# Patient Record
Sex: MALE | Race: OTHER | NOT HISPANIC OR LATINO | URBAN - METROPOLITAN AREA
[De-identification: names, ages, dates, MRNs, and addresses within clinical notes are randomized per-mention and may not be internally consistent; named-entity substitution may affect disease eponyms.]

---

## 2017-07-27 ENCOUNTER — EMERGENCY (EMERGENCY)
Facility: HOSPITAL | Age: 37
LOS: 1 days | Discharge: HOME | End: 2017-07-27
Attending: EMERGENCY MEDICINE | Admitting: EMERGENCY MEDICINE
Payer: SELF-PAY

## 2017-07-27 VITALS
TEMPERATURE: 98 F | DIASTOLIC BLOOD PRESSURE: 93 MMHG | OXYGEN SATURATION: 98 % | SYSTOLIC BLOOD PRESSURE: 131 MMHG | HEART RATE: 89 BPM | RESPIRATION RATE: 16 BRPM
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
## 2017-07-27 DIAGNOSIS — S83.209A UNSPECIFIED TEAR OF UNSPECIFIED MENISCUS, CURRENT INJURY, UNSPECIFIED KNEE, INITIAL ENCOUNTER: Chronic | ICD-10-CM
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
## 2017-07-27 DIAGNOSIS — S89.91XA UNSPECIFIED INJURY OF RIGHT LOWER LEG, INITIAL ENCOUNTER: ICD-10-CM

## 2017-07-27 PROCEDURE — 73562 X-RAY EXAM OF KNEE 3: CPT | Mod: 26,RT

## 2017-07-27 PROCEDURE — 99283 EMERGENCY DEPT VISIT LOW MDM: CPT | Mod: 25

## 2017-07-27 PROCEDURE — 73562 X-RAY EXAM OF KNEE 3: CPT

## 2017-07-27 PROCEDURE — 99053 MED SERV 10PM-8AM 24 HR FAC: CPT

## 2017-07-27 RX ORDER — ACETAMINOPHEN 500 MG
1000 TABLET ORAL ONCE
Qty: 0 | Refills: 0 | Status: COMPLETED | OUTPATIENT
Start: 2017-07-27 | End: 2017-07-27

## 2017-07-27 RX ORDER — ACETAMINOPHEN 500 MG
975 TABLET ORAL ONCE
Qty: 0 | Refills: 0 | Status: COMPLETED | OUTPATIENT
Start: 2017-07-27 | End: 2017-07-27

## 2017-07-27 RX ADMIN — Medication 1000 MILLIGRAM(S): at 23:27
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
## 2017-07-27 NOTE — ED PROVIDER NOTE - PHYSICAL EXAMINATION
CON: ao x 3, HENMT: clear oropharynx, soft neck, HEAD: atraumatic, KIN: no rash, MSK: no joint effusion or deformity, able to extend/flex fully, able to bear weight and ambulate but w/ pain, no joint laxity on exam

## 2017-07-27 NOTE — ED PROVIDER NOTE - DIAGNOSTIC INTERPRETATION
ER Physician: Andrade ELIAS KNEE XRAY INTERPRETATION:  no acute fracture; no soft tissue swelling noted; normal bony alignment.
